# Patient Record
Sex: FEMALE | URBAN - METROPOLITAN AREA
[De-identification: names, ages, dates, MRNs, and addresses within clinical notes are randomized per-mention and may not be internally consistent; named-entity substitution may affect disease eponyms.]

---

## 2023-02-23 ENCOUNTER — OFFICE VISIT (OUTPATIENT)
Dept: DENTISTRY | Facility: CLINIC | Age: 48
End: 2023-02-23

## 2023-02-23 VITALS — SYSTOLIC BLOOD PRESSURE: 119 MMHG | HEART RATE: 82 BPM | DIASTOLIC BLOOD PRESSURE: 71 MMHG | TEMPERATURE: 97.8 F

## 2023-02-23 DIAGNOSIS — K08.89 PAIN, DENTAL: Primary | ICD-10-CM

## 2023-02-24 NOTE — PROGRESS NOTES
Emergency    S: CC: "I have pain in the upper back tooth  I feel a throbbing pain that is 10/10  My back tooth is cold sensitive  I also have chip on front tooth that broke off on Friday and I cannot eat because it keeps hurting me"    Pt took Tylenol for pain management  ASA: I     O: PA taken of #8 and #3   #3 presents with localized bone loss with 3 mm buccal recession  Furcation involvement class 1       [facility], Patient has a complex medical history  EO : No swelling,  TMJ and mandibular range of motion WNL  IO:  Oral hygiene Fair, salivary glands, floor of the mouth and tongue, mucosa, palate, pharynx- no significant findings  A: # 8 presents with Class IV fracture with caries  Needs resin restoration  #3 presents with significant bone loss and recession  #3 sensitive to cold air, water when pt eats anything  P: Informed Pt that because today is only an emergeny visit and we can only address the area of extreme pain  Pt was explained that today's treatments are only to alleviate some of the pain and provide temporary relief  Pt was explained that she would need to come back for comp exam to discuss more permanent solutions for teeth  Pt was informed that #8 will require composite restoration and #3 will either require Class V filling or surgical approach  Pt understood  GLUMA was applied to buccal site #3 and GI was placed on tooth #8        NV: COMP EXAM